# Patient Record
Sex: MALE | Race: WHITE
[De-identification: names, ages, dates, MRNs, and addresses within clinical notes are randomized per-mention and may not be internally consistent; named-entity substitution may affect disease eponyms.]

---

## 2019-11-18 ENCOUNTER — HOSPITAL ENCOUNTER (OUTPATIENT)
Age: 29
End: 2019-11-18
Payer: MEDICARE

## 2019-11-18 DIAGNOSIS — A49.02: ICD-10-CM

## 2019-11-18 DIAGNOSIS — L03.011: Primary | ICD-10-CM

## 2019-11-18 LAB — STAPH AUREUS DNA BY PCR: POSITIVE

## 2019-11-18 PROCEDURE — 87640 STAPH A DNA AMP PROBE: CPT

## 2019-11-18 PROCEDURE — 87077 CULTURE AEROBIC IDENTIFY: CPT

## 2019-11-18 PROCEDURE — 87070 CULTURE OTHR SPECIMN AEROBIC: CPT

## 2019-11-18 PROCEDURE — 87075 CULTR BACTERIA EXCEPT BLOOD: CPT

## 2019-11-18 PROCEDURE — 87186 SC STD MICRODIL/AGAR DIL: CPT

## 2019-11-18 PROCEDURE — 87205 SMEAR GRAM STAIN: CPT

## 2021-11-08 ENCOUNTER — HOSPITAL ENCOUNTER (OUTPATIENT)
Dept: HOSPITAL 100 - EN | Age: 31
Discharge: HOME | End: 2021-11-08
Payer: MEDICARE

## 2021-11-08 VITALS
HEART RATE: 64 BPM | SYSTOLIC BLOOD PRESSURE: 110 MMHG | OXYGEN SATURATION: 100 % | DIASTOLIC BLOOD PRESSURE: 69 MMHG | TEMPERATURE: 96.9 F | RESPIRATION RATE: 16 BRPM

## 2021-11-08 VITALS
OXYGEN SATURATION: 99 % | HEART RATE: 68 BPM | RESPIRATION RATE: 16 BRPM | SYSTOLIC BLOOD PRESSURE: 93 MMHG | DIASTOLIC BLOOD PRESSURE: 67 MMHG

## 2021-11-08 VITALS
RESPIRATION RATE: 16 BRPM | OXYGEN SATURATION: 99 % | DIASTOLIC BLOOD PRESSURE: 69 MMHG | TEMPERATURE: 96.62 F | HEART RATE: 64 BPM | SYSTOLIC BLOOD PRESSURE: 103 MMHG

## 2021-11-08 VITALS
OXYGEN SATURATION: 100 % | DIASTOLIC BLOOD PRESSURE: 69 MMHG | SYSTOLIC BLOOD PRESSURE: 96 MMHG | RESPIRATION RATE: 16 BRPM | HEART RATE: 81 BPM

## 2021-11-08 VITALS
OXYGEN SATURATION: 100 % | SYSTOLIC BLOOD PRESSURE: 99 MMHG | DIASTOLIC BLOOD PRESSURE: 69 MMHG | RESPIRATION RATE: 16 BRPM | HEART RATE: 62 BPM

## 2021-11-08 VITALS
DIASTOLIC BLOOD PRESSURE: 69 MMHG | RESPIRATION RATE: 16 BRPM | HEART RATE: 76 BPM | SYSTOLIC BLOOD PRESSURE: 110 MMHG | OXYGEN SATURATION: 97 % | TEMPERATURE: 97 F

## 2021-11-08 VITALS — DIASTOLIC BLOOD PRESSURE: 69 MMHG | SYSTOLIC BLOOD PRESSURE: 110 MMHG

## 2021-11-08 VITALS — BODY MASS INDEX: 24.9 KG/M2

## 2021-11-08 DIAGNOSIS — K92.1: ICD-10-CM

## 2021-11-08 DIAGNOSIS — R05.3: ICD-10-CM

## 2021-11-08 DIAGNOSIS — K44.9: ICD-10-CM

## 2021-11-08 DIAGNOSIS — K60.2: ICD-10-CM

## 2021-11-08 DIAGNOSIS — K21.00: Primary | ICD-10-CM

## 2021-11-08 DIAGNOSIS — K59.00: ICD-10-CM

## 2021-11-08 PROCEDURE — 0DJD8ZZ INSPECTION OF LOWER INTESTINAL TRACT, VIA NATURAL OR ARTIFICIAL OPENING ENDOSCOPIC: ICD-10-PCS | Performed by: INTERNAL MEDICINE

## 2021-11-08 PROCEDURE — 43239 EGD BIOPSY SINGLE/MULTIPLE: CPT

## 2021-11-08 PROCEDURE — 88313 SPECIAL STAINS GROUP 2: CPT

## 2021-11-08 PROCEDURE — 88305 TISSUE EXAM BY PATHOLOGIST: CPT

## 2021-11-08 PROCEDURE — 45380 COLONOSCOPY AND BIOPSY: CPT

## 2023-04-28 ENCOUNTER — HOSPITAL ENCOUNTER (OUTPATIENT)
Dept: HOSPITAL 100 - MRI | Age: 33
Discharge: HOME | End: 2023-04-28
Payer: MEDICARE

## 2023-04-28 DIAGNOSIS — M54.9: ICD-10-CM

## 2023-04-28 DIAGNOSIS — G95.0: Primary | ICD-10-CM

## 2023-04-28 PROCEDURE — 72146 MRI CHEST SPINE W/O DYE: CPT

## 2023-04-28 PROCEDURE — 72148 MRI LUMBAR SPINE W/O DYE: CPT

## 2023-05-01 ENCOUNTER — HOSPITAL ENCOUNTER (OUTPATIENT)
Age: 33
Discharge: HOME | End: 2023-05-01
Payer: MEDICARE

## 2023-05-01 DIAGNOSIS — F84.9: ICD-10-CM

## 2023-05-01 DIAGNOSIS — G95.0: Primary | ICD-10-CM

## 2023-05-01 DIAGNOSIS — R20.0: ICD-10-CM

## 2023-05-01 DIAGNOSIS — G93.5: ICD-10-CM

## 2023-05-01 DIAGNOSIS — R25.1: ICD-10-CM

## 2023-05-01 DIAGNOSIS — M54.2: ICD-10-CM

## 2023-05-01 PROCEDURE — 72141 MRI NECK SPINE W/O DYE: CPT

## 2023-05-01 PROCEDURE — 70553 MRI BRAIN STEM W/O & W/DYE: CPT

## 2023-05-01 PROCEDURE — A9575 INJ GADOTERATE MEGLUMI 0.1ML: HCPCS

## 2023-08-21 ENCOUNTER — HOSPITAL ENCOUNTER (OUTPATIENT)
Dept: HOSPITAL 100 - MTLAB | Age: 33
Discharge: HOME | End: 2023-08-21
Payer: MEDICARE

## 2023-08-21 DIAGNOSIS — T78.40XA: ICD-10-CM

## 2023-08-21 DIAGNOSIS — R25.1: ICD-10-CM

## 2023-08-21 DIAGNOSIS — F84.9: Primary | ICD-10-CM

## 2023-08-21 DIAGNOSIS — J32.9: ICD-10-CM

## 2023-08-21 DIAGNOSIS — R20.0: ICD-10-CM

## 2023-08-21 DIAGNOSIS — R19.7: ICD-10-CM

## 2023-08-21 LAB
ALANINE AMINOTRANSFER ALT/SGPT: 32 U/L (ref 16–61)
ALBUMIN SERPL-MCNC: 3.8 G/DL (ref 3.2–5)
ALKALINE PHOSPHATASE: 83 U/L (ref 45–117)
AMMONIA: 31 UMOL/L (ref 11–32)
ANION GAP: 6 (ref 5–15)
AST(SGOT): 17 U/L (ref 15–37)
BUN SERPL-MCNC: 22 MG/DL (ref 7–18)
BUN/CREAT RATIO: 35.9 RATIO (ref 10–20)
CALCIUM SERPL-MCNC: 8.9 MG/DL (ref 8.5–10.1)
CARBON DIOXIDE: 27 MMOL/L (ref 21–32)
CHLORIDE: 108 MMOL/L (ref 98–107)
DEPRECATED RDW RBC: 40.1 FL (ref 35.1–43.9)
ERYTHROCYTE [DISTWIDTH] IN BLOOD: 12.2 % (ref 11.6–14.6)
EST GLOM FILT RATE - AFR AMER: 198 ML/MIN (ref 60–?)
FOLATES, (FOLIC ACID): 15.9 NG/ML (ref 3.1–55.4)
GLOBULIN: 3 G/DL (ref 2.2–4.2)
GLUCOSE: 82 MG/DL (ref 74–106)
HCT VFR BLD AUTO: 41.2 % (ref 40–54)
HEMOGLOBIN: 14.7 G/DL (ref 13–16.5)
HGB BLD-MCNC: 14.7 G/DL (ref 13–16.5)
MAGNESIUM: 2.2 MG/DL (ref 1.6–2.6)
MCV RBC: 91.4 FL (ref 80–94)
MEAN CORP HGB CONC: 35.7 G/DL (ref 32–36)
MEAN PLATELET VOL.: 10.3 FL (ref 6.2–12)
PLATELET # BLD: 286 K/MM3 (ref 150–450)
PLATELET COUNT: 286 K/MM3 (ref 150–450)
POTASSIUM: 3.9 MMOL/L (ref 3.5–5.1)
RBC # BLD AUTO: 4.51 M/MM3 (ref 4.6–6.2)
RBC DISTRIBUTION WIDTH CV: 12.2 % (ref 11.6–14.6)
RBC DISTRIBUTION WIDTH SD: 40.1 FL (ref 35.1–43.9)
VITAMIN B12: 458 PG/ML (ref 211–911)
WBC # BLD AUTO: 7.4 K/MM3 (ref 4.4–11)
WHITE BLOOD COUNT: 7.4 K/MM3 (ref 4.4–11)

## 2023-08-21 PROCEDURE — 85027 COMPLETE CBC AUTOMATED: CPT

## 2023-08-21 PROCEDURE — 84443 ASSAY THYROID STIM HORMONE: CPT

## 2023-08-21 PROCEDURE — 36415 COLL VENOUS BLD VENIPUNCTURE: CPT

## 2023-08-21 PROCEDURE — 82607 VITAMIN B-12: CPT

## 2023-08-21 PROCEDURE — 86005 ALLG SPEC IGE MULTIALLG SCR: CPT

## 2023-08-21 PROCEDURE — 84425 ASSAY OF VITAMIN B-1: CPT

## 2023-08-21 PROCEDURE — 82746 ASSAY OF FOLIC ACID SERUM: CPT

## 2023-08-21 PROCEDURE — 86003 ALLG SPEC IGE CRUDE XTRC EA: CPT

## 2023-08-21 PROCEDURE — 83735 ASSAY OF MAGNESIUM: CPT

## 2023-08-21 PROCEDURE — 80053 COMPREHEN METABOLIC PANEL: CPT

## 2023-08-21 PROCEDURE — 82140 ASSAY OF AMMONIA: CPT

## 2023-08-25 LAB
AMER ROACH IGE QN: <0.1 KU/L
BLACK WALNUT IGE QN: <0.1 KU/L
CAT DANDER IGE QN: <0.1 KU/L
COMMON RAGWEED IGE QN: <0.1 KU/L
DEPRECATED IGE QN: <0.1 KU/L
DOG EPITH IGE QN: <0.1 KU/L
EGG WHITE IGE QN: <0.1 KU/L
HAZELNUT POLN IGE QN: <0.1 KU/L
KENT BLUE GRASS IGE QN: <0.1 KU/L
MT JUNIPER IGE QN: <0.1 KU/L
PLANTAIN, ENGLISH: <0.1 KU/L
SCALLOP: <0.1 KU/L
SESAME SEED: <0.1 KU/L
SHEEP SORREL IGE QN: <0.1 KU/L
SYCAMORE, AMERICAN: <0.1 KU/L
VITAMIN B1, THIAMINE: 135.8 NMOL/L (ref 66.5–200)
WHITE ELM IGE QN: <0.1 KU/L
WHITE HICKORY IGE QN: <0.1 KU/L
WHITE MULBERRY IGE QN: <0.1 KU/L
WHITE OAK IGE QN: <0.1 KU/L
WHOLE EGG IGE QN: <0.1 KU/L

## 2023-10-30 ENCOUNTER — HOSPITAL ENCOUNTER (OUTPATIENT)
Dept: HOSPITAL 100 - CT | Age: 33
Discharge: HOME | End: 2023-10-30
Payer: MEDICARE

## 2023-10-30 DIAGNOSIS — J32.8: Primary | ICD-10-CM

## 2023-10-30 PROCEDURE — 70486 CT MAXILLOFACIAL W/O DYE: CPT

## 2023-11-01 ENCOUNTER — HOSPITAL ENCOUNTER (OUTPATIENT)
Dept: HOSPITAL 100 - LABSPEC | Age: 33
Discharge: HOME | End: 2023-11-01
Payer: MEDICARE

## 2023-11-01 DIAGNOSIS — J32.8: Primary | ICD-10-CM

## 2023-11-01 PROCEDURE — 87077 CULTURE AEROBIC IDENTIFY: CPT

## 2023-11-01 PROCEDURE — 87205 SMEAR GRAM STAIN: CPT

## 2023-11-01 PROCEDURE — 87186 SC STD MICRODIL/AGAR DIL: CPT

## 2023-11-01 PROCEDURE — 87070 CULTURE OTHR SPECIMN AEROBIC: CPT

## 2024-03-06 ENCOUNTER — HOSPITAL ENCOUNTER (OUTPATIENT)
Age: 34
Discharge: HOME | End: 2024-03-06
Payer: MEDICARE

## 2024-03-06 DIAGNOSIS — J01.90: Primary | ICD-10-CM

## 2024-03-06 PROCEDURE — 87077 CULTURE AEROBIC IDENTIFY: CPT

## 2024-03-06 PROCEDURE — 87205 SMEAR GRAM STAIN: CPT

## 2024-03-06 PROCEDURE — 87070 CULTURE OTHR SPECIMN AEROBIC: CPT

## 2024-03-06 NOTE — XMS RPT_ITS
Comprehensive CCD (C-CDA v2.1)  
  
                            Created on: 2024  
  
  
TELMA HENRY  
External Reference #: CDR,PersonID:[id number]  
: 1990  
Sex: Male  
  
Demographics  
  
  
                                        Address             [address] [place] [address] [place]  [address]  
   
                                        Home Phone          [phone]  
   
                                        Home Phone          [phone]  
   
                                        Work Phone          [phone]  
   
                                        Work Phone          [phone]  
   
                                        Preferred Language  en  
   
                                        Marital Status      Single  
   
                                        Christianity Affiliation Unknown  
   
                                        Race                White  
   
                                        Ethnic Group        Not  or Lati  
no  
  
  
Author  
  
  
                                        Name                Unknown  
   
                                        Address             [address]  
[address]  
[place]  [address]  
   
                                        Phone               [phone]  
   
                                        Organization        [facility]  
  
  
Care Team Providers  
  
  
                                Care Team Member Name Role            Phone  
   
                                Nagi Hicks Unavailable     Unavailable  
   
                                Nagi Hicks Unavailable     Unavailable  
   
                                Alexa Barrera Unavailable     Unavailable  
   
                                Alexa Barrera Primary Care Provider [phone]  
   
                                Alexa Barrera Primary Care Provider [phone]  
   
                                TINO BAUGH, DR. ALEXA IGLESIAS Primary Care    Unavailjaky GOLDSTEIN MD, SALLY IGLESIAS Admitting       Unavailable  
   
                                SALLY GOLDSTEIN MD Referring       Unavailable  
   
                                MAURI MCCOY DO Attending       Unavailable  
   
                                MITESH DIALLO Referring       Unavailable  
   
                                ALEXA BARRERA Primary Care    Unavailable  
   
                                RUTH ALEXANDER Attending       Unavailable  
   
                                Alexa Barrera MD Primary Care Provider [phone]  
  
  
  
Allergies  
  
  
                                                    Allergy   
Classification                          Reported   
Allergen(s)               Allergy Type              Date of   
Onset                     Reaction(s)               Facility  
   
                                                      
(3 sources)                             Amoxicillin;   
Translations:   
[AMOXICILLIN]   Drug Allergy    2010      Rash            [facility]  
  
  
  
Medications  
Current Medications  
  
  
  
                      Medication Drug Class(es) Dates      Sig (Normalized) Sig   
(Original)  
   
                                                    clindamycin 300 mg   
oral capsule  
(1 source)                              Lincosamide   
Antibacterial                           Start:   
2023  
End:   
2024                              take 1 capsule by   
mouth three times   
daily                                   clindamycin   
(CLEOCIN) 300 mg   
capsule   
Indications:   
Paronychia of   
finger,   
unspecified   
laterality Take 1   
capsule by mouth   
three times a day   
for 10 days. 30   
capsule 0   
2023 Active  
  
  
  
                                          
  
  
  
Completed/Discontinued Medications  
  
  
  
                      Medication Drug Class(es) Dates      Sig (Normalized) Sig   
(Original)  
   
                                                    albuterol 0.83   
mg/ml inhalation   
solution  
(4 sources)                             beta2-Adrenergic   
Agonist                                 Start:   
10-                                          albuterol   
(PROVENTIL) 2.5 mg   
/3 mL (0.083 %)   
nebulizer solution   
3 mL by nebulizer   
q4h prn breathing   
for 30 days 0   
10/24/2018 Active  
  
  
  
                                          
   
                                          
   
                                          
   
                                          
   
                                          
   
                                          
   
                                          
   
                                          
   
                                          
   
                                          
   
                                          
   
                                          
   
                                          
   
                                          
   
                                          
   
                                          
   
                                          
   
                                          
   
                                          
   
                                          
   
                                          
   
                                          
   
                                          
   
                                          
   
                                          
   
                                          
  
  
  
Problems  
Active Problems  
  
  
                                                    Problem   
Classification  Problem         Date            Documented Date Episodic/Chronic  
   
                                                    Other nervous system   
disorders  
(3 sources)                             Syringomyelia and   
syringobulbia;   
Translations:   
[Syringomyelia and   
syringobulbia]                          Onset:   
2022                                          Chronic  
   
                                                    Other nervous system   
disorders  
(1 source)                              Syringomyelia and   
syringobulbia;   
Translations:   
[Syringomyelia and   
syringobulbia (HCC)]                    Onset:   
2022                                          Chronic  
   
                                                    Skin and subcutaneous   
tissue infections  
(2 sources)                             Cellulitis of   
unspecified finger;   
Translations:   
[Paronychia of   
finger]                                 Onset:   
2023                Episodic  
  
  
Past or Other Problems  
  
  
                      Problem Classification Problem    Date       Documented Da  
te Episodic/Chronic  
   
                                                    Other bone disease and   
musculoskeletal   
deformities  
(2 sources)                             Disorder of   
skeletal system;   
Translations:   
[Disorder of   
bone,   
unspecified]                            Onset:   
2010                Episodic  
  
  
  
Results  
  
  
                      Test Name  Value      Interpretation Reference Range Facil  
ity  
  
  
  
                                          
   
                                          
   
                                          
   
                                          
   
                                          
   
                                          
   
                                          
   
                                          
   
                                          
   
                                          
   
                                          
   
                                          
   
                                          
   
                                          
   
                                          
   
                                          
   
                                          
   
                                          
   
                                          
   
                                          
   
                                          
   
                                          
   
                                          
   
                                          
   
                                          
   
                                          
   
                                          
   
                                          
   
                                          
   
                                          
   
                                          
   
                                          
   
                                          
   
                                          
   
                                          
   
                                          
   
                                          
   
                                          
   
                                          
   
                                          
   
                                          
   
                                          
   
                                          
   
                                          
   
                                          
   
                                          
   
                                          
   
                                          
   
                                          
   
                                          
   
                                          
   
                                          
   
                                          
   
                                          
   
                                          
   
                                          
   
                                          
   
                                          
   
                                          
   
                                          
   
                                          
   
                                          
   
                                          
   
                                          
   
                                          
   
                                          
   
                                          
   
                                          
   
                                          
   
                                          
   
                                          
   
                                          
   
                                          
   
                                          
   
                                          
   
                                          
   
                                          
   
                                          
   
                                          
   
                                          
   
                                          
   
                                          
   
                                          
   
                                          
   
                                          
   
                                          
   
                                          
   
                                          
   
                                          
   
                                          
   
                                          
   
                                          
   
                                          
   
                                          
   
                                          
   
                                          
   
                                          
   
                                          
   
                                          
   
                                          
   
                                          
   
                                          
   
                                          
   
                                          
   
                                          
   
                                          
   
                                          
   
                                          
   
                                          
   
                                          
   
                                          
   
                                          
   
                                          
   
                                          
   
                                          
   
                                          
   
                                          
   
                                          
   
                                          
   
                                          
   
                                          
   
                                          
   
                                          
   
                                          
   
                                          
   
                                          
   
                                          
   
                                          
   
                                          
   
                                          
   
                                          
   
                                          
   
                                          
   
                                          
   
                                          
   
                                          
   
                                          
   
                                          
   
                                          
   
                                          
   
                                          
   
                                          
   
                                          
   
                                          
   
                                          
   
                                          
   
                                          
   
                                          
   
                                          
   
                                          
   
                                          
   
                                          
   
                                          
   
                                          
   
                                          
   
                                          
   
                                          
   
                                          
   
                                          
   
                                          
   
                                          
   
                                          
   
                                          
   
                                          
   
                                          
   
                                          
   
                                          
   
                                          
   
                                          
   
                                          
   
                                          
   
                                          
   
                                          
   
                                          
   
                                          
   
                                          
   
                                          
   
                                          
   
                                          
   
                                          
   
                                          
   
                                          
   
                                          
   
                                          
   
                                          
   
                                          
   
                                          
   
                                          
   
                                          
   
                                          
   
                                          
   
                                          
   
                                          
   
                                          
   
                                          
   
                                          
   
                                          
   
                                          
   
                                          
   
                                          
   
                                          
   
                                          
   
                                          
   
                                          
   
                                          
   
                                          
   
                                          
   
                                          
   
                                          
   
                                          
   
                                          
   
                                          
   
                                          
   
                                          
   
                                          
   
                                          
   
                                          
   
                                          
   
                                          
   
                                          
   
                                          
   
                                          
   
                                          
   
                                          
   
                                          
   
                                          
   
                                          
   
                                          
   
                                          
   
                                          
   
                                          
   
                                          
   
                                          
   
                                          
   
                                          
   
                                          
   
                                          
   
                                          
   
                                          
   
                                          
   
                                          
   
                                          
   
                                          
   
                                          
   
                                          
   
                                          
   
                                          
   
                                          
   
                                          
   
                                          
   
                                          
   
                                          
   
                                          
   
                                          
   
                                          
   
                                          
   
                                          
   
                                          
   
                                          
   
                                          
   
                                          
   
                                          
   
                                          
   
                                          
   
                                          
   
                                          
   
                                          
   
                                          
   
                                          
   
                                          
   
                                          
   
                                          
   
                                          
   
                                          
   
                                          
   
                                          
   
                                          
   
                                          
   
                                          
   
                                          
   
                                          
   
                                          
   
                                          
   
                                          
   
                                          
   
                                          
   
                                          
   
                                          
   
                                          
   
                                          
   
                                          
   
                                          
   
                                          
   
                                          
   
                                          
   
                                          
   
                                          
   
                                          
   
                                          
   
                                          
   
                                          
   
                                          
   
                                          
   
                                          
   
                                          
   
                                          
   
                                          
   
                                          
   
                                          
   
                                          
   
                                          
   
                                          
   
                                          
   
                                          
   
                                          
   
                                          
   
                                          
   
                                          
   
                                          
   
                                          
   
                                          
   
                                          
   
                                          
   
                                          
   
                                          
   
                                          
   
                                          
   
                                          
   
                                          
   
                                          
   
                                          
   
                                          
   
                                          
   
                                          
   
                                          
   
                                          
   
                                          
   
                                          
   
                                          
   
                                          
   
                                          
   
                                          
   
                                          
  
  
  
Vital Signs  
  
  
                      Date Time  Vital Sign Value      [facility]  
[facility]ity  
   
                                                    2023   
13:          Body temperature    98.1 [degF]         Ruth Santanan DO  
Work Phone:   
[phone]                          [facility]  
   
                                                    2023   
13:          Body weight         54.43 kg            Ruth Beanughn DO  
Work Phone:   
[phone]                          [facility]  
   
                                                    2023   
13:                              Diastolic blood   
pressure                  71 mm[Hg]                 Ruth Beanughn DO  
Work Phone:   
[phone]                          [facility]  
   
                                                    2023   
13:          Heart rate          81 /min             Ruth Beanughn DO  
Work Phone:   
[phone]                          [facility]  
   
                                                    2023   
13:          Respiratory rate    20 /min             Ruth Beanughn DO  
Work Phone:   
[phone]                          [facility]  
   
                                                    2023   
13:                              SaO2% (BldA) [Mass   
fraction]                 95 %                      Ruth Beanughn DO  
Work Phone:   
[phone]                          [facility]  
   
                                                    2023   
13:                              Systolic blood   
pressure                  111 mm[Hg]                Ruth Alexander DO  
Work Phone:   
[phone]                          [facility]  
  
  
  
Encounters  
  
  
                          Encounter Date Encounter Type Care Provider Facility  
   
                                                    Start: 2023  
End: 2023     ambulatory          ALEXA BARRERA    Facility:[id number]  
   
                                                    Start: 2023  
End: 2023                         Patient encounter   
procedure                               Ruth Alexander DO  
Work Phone:   
[phone]                          [facility]   
[facility]  
  
  
  
                                          
   
                                          
   
                                          
   
                                          
   
                                          
   
                                          
   
                                          
   
                                          
   
                                          
  
  
  
Procedures  
  
  
                          Date         Procedure    Procedure Detail Performing   
Clinician  
   
                                        Start: 2022   COMPREHENSIVE ORAL   
EVALUATION - NEW OR   
ESTABLISHED PATIENT                                 Luis Austinno DDS  
Work Phone:   
[phone]  
   
                          Start: 2022 PERIODONTAL PROBING              Jus  
martha Yepez DDS  
Work Phone:   
[phone]  
   
                                        Start: 2021   Adult depression scr  
eening   
assessment                                          Luis Yepez DDS  
Work Phone:   
[phone]  
   
                                        Start: 2020   Mri spinal canal tho  
racic   
w/o contrast matrl                                  Valeria Montana  
Work Phone:   
[phone]  
   
                                        Start: 2020   Mri spinal canal cer  
vical   
w/o contrast matrl                                  Mitesh Diallo  
Work Phone:   
[phone]  
  
  
  
Plan of Treatment  
  
  
                          Date         Care Activity Detail       Author  
   
                                                    Start:   
2027                              Urine microalbumin   
profile                                 DTaP,Tdap,Td Vaccine (2   
- Td or Tdap)                           [facility]  
   
                                                    Start:   
2023                              Covid-19 Vaccine ( season)                         Covid-19 Vaccine ( season)                         [facility]  
   
                                                    Start:   
2023          Depression Assessment Depression Assessment [facility]  
   
                                                    Start:   
2022          Influenza vaccination INFLUENZA (#1)      [facility]  
   
                                                    Start:   
2022                              Adult depression   
screening assessment      DEPRESSION SCREENING      [facility]  
   
                                                    Start:   
2020          Influenza vaccination Flu vaccine (#1)    [place]  
   
                                                    Start:   
2011                              DTaP/Tdap/Td vaccine (1   
- Tdap)                                 DTaP/Tdap/Td vaccine (1   
- Tdap)                                 [place]  
   
                                                    Start:   
2011                              Urine microalbumin   
profile                   DTAP,TDAP,TD (1 - Tdap)   [facility]  
   
                                                    Start:   
2010          HEPATITIS C SCREENING HEPATITIS C SCREENING [facility]  
   
                                                    Start:   
2010          Hepatitis C screening Hepatitis C Screening [facility]  
   
                                                    Start:   
2010          HIV SCREENING       HIV SCREENING       [facility]  
   
                                                    Start:   
2010          HIV screening       HIV Screening       [facility]  
   
                                                    Start:   
2007          HIV screening       HIV screen          [place]  
   
                                                    Start:   
1993                              Varicella vaccine (1 of   
2 - 2-dose childhood   
series)                                 Varicella vaccine (1 of   
2 - 2-dose childhood   
series)                                 [place]  
   
                                                    Start:   
1992                              HEPATITIS B (1 of 3 -   
3-dose series)                          HEPATITIS B (1 of 3 -   
3-dose series)                          [facility]  
   
                                                    Start:   
1992                              Hepatitis B Vaccine (1   
of 3 - 3-dose series)                   Hepatitis B Vaccine (1   
of 3 - 3-dose series)                   [facility]  
   
                                                PERIODONTAL MAINTENANCE PERIODON  
ERIK MAINTENANCE   
Dental Routine 1   
Occurrences starting   
2022                              [facility]  
Work Phone:   
[phone]  
  
  
  
                                          
   
                                          
   
                                          
   
                                          
  
  
  
Payers  
  
  
                          Date         Payer Category Payer        Policy ID  
   
                          2022   Unknown                   [id number]  
[id number]  
[id number]  
   
                                2021      Medicare        [facility] MEDICARE [facility]  
 DUAL   
COMPLETE HMO SNP   
[id number]   
2021-Present   
[phone] [address]   
[address] [place]   
[address] Medicare                     [id number]  
[id number]  
   
                          2021   Private Health Insurance              [id number]  
[id number]  
   
                          2015   Medicaid                  [id number]  
   
                                2015      Medicaid        MEDICAID [facility]  
   
MEDICAID [id number]   
2015-Present   
[phone] [address]   
[address] [place] [address]   
Medicaid                                [id number]  
[id number]  
   
                          1992   Unknown                   [id number]   
[id number]  
[id number]  
  
  
  
Social History  
  
  
                          Date         Type         Detail       Facility  
   
                                                            [facility]  
[facility]                      Unknown if ever smoked    [employer]  
   
                                       Sex Assigned At Birth Not on file  [email or web address] [place], [place]  
   
                                        Start: 2022   Tobacco smoking stat  
[facility]                      Never smoked tobacco      [facility]  
   
                                        Start: 2022   Tobacco use and   
exposure                                Smokeless tobacco   
non-user                                [facility]  
   
                                                    Start: 2022  
End: 2023           Alcohol intake            Current drinker of   
alcohol (finding)                       [facility]  
   
                                        Start: 2021   History SDOH Alcohol  
   
Comment                   occasionally              [facility]  
   
                          Start: 1990 Sex Assigned At Birth Male         C  
[facility]  
   
                                                    Start: 09-  
End: 2022                         Exposure to SARS-CoV-2   
(event)                   Not sure                  [facility]  
   
                                                    Start: 2023  
End: 2023                         History of Social   
function                                            [facility]  
   
                                                    Start: 2023  
End: 2023     Tobacco use panel                       [facility]  
   
                                                            Adult Depression   
Screening Assessment      0                         [facility]  
   
                                Start: 10- Gender identity Identifies as   
male   
gender (finding)                        [facility]  
   
                          Start: 10- Sexual orientation Heterosexual (fin  
ding) [facility]  
  
  
  
Progress note 2023  
  
  
                                Note Date & Type Note            Facility  
   
                                        2023 Note     [facility] ID: [id number]  
Author: Ruth Alexander, DO  
Service: ?  
Author Type: Physician  
Type: Progress Notes  
Filed: 2023 2:15 PM  
Note Text:  
Telma Henry is a 33 year old MALE who   
presents with Right middle  
finger swollen, red, puss ([3 days] Right hand   
middle finger festering.  
Left hand index and little finger.)  
HPI  
PAST MEDICAL HISTORY  
Diagnosis Date  
Arnold-Chiari malformation, type I (HCC)  
Arthritis  
Asthma  
GERD (gastroesophageal reflux disease)  
Scoliosis  
ACTIVE PROBLEM LIST  
Disorder of Bone and Cartilage, Unspecified  
Current Outpatient Medications  
Medication Sig Dispense Refill  
albuterol (PROVENTIL) 2.5 mg /3 mL (0.083 %)   
nebulizer solution 3 mL by  
nebulizer q4h prn breathing for 30 days  
albuterol HFA (PROVENTIL HFA, VENTOLIN HFA) 90   
mcg/actuation inhaler  
Inhale as instructed.  
baclofen (GABLOFEN) 50 mcg/mL (1 mL) injection   
0.05 ml Orally Twice a day  
diclofenac (VOLTAREN) 1 % topical gel  
doxycycline (VIBRA-TABS) 100 mg tablet  
fluticasone (FLONASE ALLERGY RELIEF) 50   
mcg/actuation nasal spray 1  
spray(s) intranasally once a day for 30 day(s)  
meloxicam (MOBIC) 15 mg tablet  
pantoprazole DR (PROTONIX) 40 mg tablet  
Chlorhexidine Gluconate (PERIDEX) 0.12 %   
solution Rinse around the mouth  
with 1 to 2 teaspoons for 30 seconds then   
expectorate. 473 mL 1  
gabapentin (NEURONTIN) 400 mg capsule  
fexofenadine HCl (ALLEGRA ORAL) Take by mouth.   
Take one tablet in the  
morning daily  
MULTI-VITAMIN ORAL Take by mouth.  
CLONIDINE 0.1 MG TAB Take 1 tablet in the AM   
and take 2 tablets at  
bedtime 0  
ALBUTEROL 90 MCG/ACTUATION AEROSOL INHALER   
Inhale one(1) - two(2) puffs  
four(4) times a day as needed for wheezing and   
shortness of breath. 0  
clindamycin (CLEOCIN) 300 mg capsule Take 1   
capsule by mouth three times  
a day for 10 days. 30 capsule 0  
No current facility-administered medications   
for this visit.  
Social History  
Tobacco Use  
Smoking status: Never  
Smokeless tobacco: Never  
Vaping Use  
Vaping Use: Never used  
Substance Use Topics  
Alcohol use: Yes  
Comment: occasionally  
Drug use: Never  
Alcohol Use: Yes (occasionally)  
Tobacco Use: Never  
FAMILY HISTORY  
Problem Relation Age of Onset  
Diabetes Maternal Aunt  
other (macular degeneration [Other]) Father  
Review of Systems  
Skin:  
Patient has infected fingers on both hands its   
his index finger on  
both hands infected paronychia  
All other systems reviewed and are negative.  
/71   Pulse 81   Temp 98.1   Resp 20   Wt   
120 lb (54.4kg)   SpO2 95%  
Physical Exam  
Vitals and nursing note reviewed.  
Constitutional:  
Appearance: Normal appearance.  
Skin:  
General: Skin is warm.  
Findings: Erythema present.  
Comments: Both hands have an infected finger   
bilaterally and it looks  
like the one on the right is much worse and he   
has been soaking it in  
peroxide which is not the best choice. But he   
bites his nails and trims  
his nails and he got them too close and then he   
washed dishes at [employer]  
and that had his hand down and that dirty water   
and did not have any  
gloves on and because of the paronychia to form  
Neurological:  
Mental Status: He is alert.  
ASSESSMENT/PLAN:  
1. Paronychia of finger, unspecified laterality   
- ICD9: 681.02, ICD10:  
L03.019  
- CLINDAMYCIN  MG CAPSULE  
Ruth DONNY Alexander                           [facility]  
  
  
  
History of Present illness Narrative 2023  
                 BenjaminRuthn, DO - 2023 2:12 PM EST  
  
                                Note Date & Type Note            Facility  
   
                                                    2023 History of Presen  
t   
illness Narrative                       Formatting of this note is   
different from the original.  
Telma Henry is a 33 year old   
MALE who presents with Right middle   
finger swollen, red, puss ([3 days]   
Right hand middle finger festering.   
Left hand index and little finger.)  
  
HPI  
  
PAST MEDICAL HISTORY  
Diagnosis Date  
Arnold-Chiari malformation, type I   
(HCC)  
Arthritis  
Asthma  
GERD (gastroesophageal reflux   
disease)  
Scoliosis  
  
ACTIVE PROBLEM LIST  
Disorder of Bone and Cartilage,   
Unspecified  
  
  
Current Outpatient Medications  
Medication Sig Dispense Refill  
albuterol (PROVENTIL) 2.5 mg /3 mL   
(0.083 %) nebulizer solution 3 mL   
by nebulizer q4h prn breathing for   
30 days  
albuterol HFA (PROVENTIL HFA,   
VENTOLIN HFA) 90 mcg/actuation   
inhaler Inhale as instructed.  
baclofen (GABLOFEN) 50 mcg/mL (1   
mL) injection 0.05 ml Orally Twice   
a day  
diclofenac (VOLTAREN) 1 % topical   
gel  
doxycycline (VIBRA-TABS) 100 mg   
tablet  
fluticasone (FLONASE ALLERGY   
RELIEF) 50 mcg/actuation nasal   
spray 1 spray(s) intranasally once   
a day for 30 day(s)  
meloxicam (MOBIC) 15 mg tablet  
pantoprazole DR (PROTONIX) 40 mg   
tablet  
Chlorhexidine Gluconate (PERIDEX)   
0.12 % solution Rinse around the   
mouth with 1 to 2 teaspoons for 30   
seconds then expectorate. 473 mL 1  
gabapentin (NEURONTIN) 400 mg   
capsule  
fexofenadine HCl (ALLEGRA ORAL)   
Take by mouth. Take one tablet in   
the morning daily  
MULTI-VITAMIN ORAL Take by mouth.  
CLONIDINE 0.1 MG TAB Take 1 tablet   
in the AM and take 2 tablets at   
bedtime 0  
ALBUTEROL 90 MCG/ACTUATION AEROSOL   
INHALER Inhale one(1) - two(2)   
puffs four(4) times a day as needed   
for wheezing and shortness of   
breath. 0  
clindamycin (CLEOCIN) 300 mg   
capsule Take 1 capsule by mouth   
three times a day for 10 days. 30   
capsule 0  
  
No current facility-administered   
medications for this visit.  
  
Social History  
  
Tobacco Use  
Smoking status: Never  
Smokeless tobacco: Never  
Vaping Use  
Vaping Use: Never used  
Substance Use Topics  
Alcohol use: Yes  
Comment: occasionally  
Drug use: Never  
  
  
Alcohol Use: Yes (occasionally)  
  
Tobacco Use: Never  
  
FAMILY HISTORY  
Problem Relation Age of Onset  
Diabetes Maternal Aunt  
other (macular degeneration   
[Other]) Father  
  
Review of Systems  
Skin:  
Patient has infected fingers on   
both hands its his index finger on   
both hands infected paronychia  
All other systems reviewed and are   
negative.  
  
  
/71   Pulse 81   Temp 98.1     
Resp 20   Wt 120 lb (54.4kg)   SpO2   
95%  
  
  
  
Physical Exam  
Vitals and nursing note reviewed.  
Constitutional:  
Appearance: Normal appearance.  
Skin:  
General: Skin is warm.  
Findings: Erythema present.  
Comments: Both hands have an   
infected finger bilaterally and it   
looks like the one on the right is   
much worse and he has been soaking   
it in peroxide which is not the   
best choice. But he bites his nails   
and trims his nails and he got them   
too close and then he washed dishes   
at [employer] and that had his hand   
down and that dirty water and did   
not have any gloves on and because   
of the paronychia to form  
Neurological:  
Mental Status: He is alert.  
  
  
  
ASSESSMENT/PLAN:  
1. Paronychia of finger,   
unspecified laterality - ICD9:   
681.02, ICD10: L03.019  
  
- CLINDAMYCIN  MG CAPSULE  
  
Ruth Alexander  
Electronically signed by Ruth Alexander DO at 2023 2:15 PM   
EST  
documented in this encounter            [facility]  
  
  
  
History of Present illness Narrative 2022  
                  Luis Yepez DDS - 2022 11:50 AM EDT  
  
                                Note Date & Type Note            Facility  
   
                                                    2022 History of Presen  
t   
illness Narrative                       Formatting of this note might be   
different from the original.  
COMPREHENSIVE ORAL EXAM  
S: Patient presents for myah  
A: Decay/Restorations: charted  
Periodontal Dx: generalized mild   
chronic periodontitis with heavy   
plaque buildup  
Oral Cancer Screening: wnl  
P: Reviewed/Updated Medical history;   
comprehensive oral exam completed   
with periodontal evaluation, dental   
charting, and selective radiographs   
taken. Exam findings were discussed.  
Pt is special needs. Previously saw   
Dr. Carl Molina, who was able to   
complete prophy's while patient was   
awake. Pt has no hx of caries.   
Existing #16 is partially erupted   
with carious decay. #19 O decay.   
Recommended #19 O restoration.   
Referral to [facility] for ext of #16   
under sedation. Pt presented with   
highly inflammed gingiva,   
subgingival calculus, heavy plaque,   
and minor bone loss resulting in dx   
of periodontitis. Recommended 4   
quads SRP.  
  
Discussed with pt's parent to   
attempt treatment of #19 O and SRP   
awake at [facility]. Cautioned if pt   
is unable to cooperate to perform   
the treatment safely then pt will   
need to be referred to an OR for   
sedation dentistry. Pt's parent   
understood.  
  
Gave referral to [facility] for ext of   
#16.  
  
Rx: peridex  
  
The following treatment plan was   
made:  
1. #19 O composite or amalgam  
2. SRP 4 quads  
  
Patient discharged in good   
condition.  
NV: #19  
Electronically signed by Luis Yepez DDS at 2022 11:55 AM   
EDT  
documented in this encounter            [facility]  
  
  
  
Progress note 2021  
  
  
                                Note Date & Type Note            Facility  
   
                                        2021 Note     [facility] ID: [id number]  
Author: Mike Vargas MD  
Service: ?  
Author Type: Physician  
Type: Progress Notes  
Filed: 2021 2:36 PM  
Note Text:  
This note was created using NoteWriter.  
Subjective  
Telma Henry is a 30 year old male.   
He has ah/o developmental  
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   
active chicken pox. ?Patient had a  
chiari decompression in 2008 due   
to increased difficulty walking.  
?Surgery was done by Dr. Castro.   
?After surgery he started having pain  
in the neck and right shoulder area. ?He   
has been trying to have this  
treated, he has been through numerous   
pain regimes and trying to cure this  
pain himself.  
?  
He continues to have more pain in the   
neck into the shoulder, down the R  
arm and into the middle finger and ring   
finger. He has no feeling in the  
arm for about 4 years. This started in   
the two middle fingers. Mother was  
told that this was caused by the syrinx.   
He can use the arm. He has  
started trembling and numbing on the   
forearm and upper arm over the past 6  
mos. The tremors going on for about 2   
years. He is now developing L wrist  
pain and tingling and wearing a wrist   
brace. Mother took him to the  
neurologist. He was concerned that his   
spine may have moved a bit. They  
have MRIs every year through their   
neurologist. No change in the walking.  
He has abnormal gait to begin with but no   
changes. He has lost some  
weight. No change in bowel or bladder   
function. He does have an on-going  
fissure.  
?  
Review of Systems  
Objective  
/67   Pulse 84   Resp 16   Ht 150.5   
cm (4' 11.25 )   Wt 55.1 kg  
(121 lb 8 oz)   SpO2 98%   BMI 24.33   
kg/m?  
Physical Exam  
Assessment and Plan  
S/p Chiari decompression   
Persistent cervicothoracic syrinx  
Ongoing pain in R neck, shoulder, arm  
New tremors in R hand  
Numbing in forearm and upper arm on R  
Pain in 4th and 5th digits  
Newer onset of L wrist pain  
?  
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   
with active chicken pox.  
?  
Reviewed image:  
MRI reviewed from 2020  
Cervical and thoracic imaging  
Long thin cervical thoracic syrinx 1mm   
diameter, pre-surgery in  9mm  
diameter  
Large craniotomy defect  
Well reconstucted cisterna magna  
Plan:  
Explained findings and pointed out on MRI  
Continue with Neurontin 1200 mg / day  
Maintain activity- no heavy lifting over   
30lbs  
Mother id grateful  
Pt seen and examined by Dr. Vargas. All   
recommendations initiated by Dr. Vargas.  
Mike Vargas MD                     [facility]  
  
  
  
Progress note 2021  
  
  
                                Note Date & Type Note            Facility  
   
                                        2021 Note     [facility] ID: [id number]  
Author: Juan Jade MD  
Service: ?  
Author Type: Physician  
Type: Progress Notes  
Filed: 5/3/2021 11:52 AM  
Note Text:  
Virtual visit completed. Telma is a very   
pleasant 29 y/o M with history  
of Chiari (s/p decompression) and   
syringomyelia. He complains of chronic  
neck pain (mostly at the site of his   
posterior scar) and pain in his right  
shoulder blade. MRI cervical spine shows   
no sig central or foraminal  
stenosis. X-rays reveal a well balanced   
28 deg right thoracic AIS curve.  
Discussed the importance of a regular   
PT/home exercise program. No  
indication for surgery for his AIS curve.   
Recommend new scoliosis x-rays  
in 2 years.  
Juan Jade MD                      [facility]  
  
  
  
Progress note 2021  
  
  
                                Note Date & Type Note            Facility  
   
                                        2021 Note     [facility] ID: [id number]  
Author: Ned Estrella (Tech)  
Service: Radiology  
Author Type: Technician  
Type: Progress Notes  
Filed: 2021 1:17 PM  
Note Text:  
Radiology Service Progress Note  
PATIENT NAME: Telma Henry  
MRN: [id number]  
DATE OF SERVICE: 2021  
TIME: 1:17 PM  
PATIENT IDENTITY VERIFICATION COMPLETED   
USING TWO (2) IDENTIFIERS: Name  
and Date of Birth confirmed by patient   
verbally.  
FALL SCREENING: Has the patient had 2   
falls in the last year or 1 fall  
with injury or currently using an   
Ambulatory Assistive Device (Walker,  
Cane, Wheelchair, Crutches, etc.)? No  
PATIENT GENDER DATA: Male  
PATIENT RELEVANT IMPLANT DATA REVIEWED:   
Not Applicable  
RADIOLOGY DEPARTMENT: General X-ray:   
Exam(s) Completed: Spine X-Ray(s):  
[facility]  
PERIPHERAL IV DATA: Not applicable  
SIGNED BY: Ned Estrella  
2021 1:17 PM                  [facility]  
  
  
  
Progress note 2021  
  
  
                                Note Date & Type Note            Facility  
   
                                        2021 Note     HNO ID: [id number]  
Author: NAKITA Hernandez (Cnp).CNP  
Service: ?  
Author Type: Nurse Practitioner  
Type: Progress Notes  
Filed: 2021 2:52 PM  
Note Text:  
SPINE SURGERY OUTPATIENT CONSULT  
SERVICE DATE: 2021  
PCP: Alexa Barrera MD  
REFERRING PROVIDER:  
Valeria Montana PA-C  
[address] [facility] [address]  
Consult requested for an opinion   
regarding the evaluation and treatment  
of mid back pain. My final impression and   
recommendations will be  
communicated back to the requesting   
physician by way of the shared medical  
record or letter via US mail.  
SUBJECTIVE  
Telma Henry is a 30 year old male   
presenting with mother.  
CHIEF COMPLAINT: Neck/Mid Back Pain  
HISTORY OF PRESENT ILLNESS  
Hx of chiari decompression in  at   
[facility]. Currently seeing  
Dr. Greenwood  
C/o neck (occipital) pain that radiates   
into the mid back x6 months. Pain  
is intermittent at random. Notes numbness   
in right arm into the  
middle/ring finger. Denies any subjective   
weakness in arms.  
Denies any changes in balance instability   
aside from baseline.  
Denies any loss in bowel/bladder control.  
Conservative Management  
-Cortisone injection (scapula bursitis)  
-Physical Therapy- completed in 2021  
-NSAIDS- Aleve (Naproxen)  
-Gabapentin 1200 per day  
-Massage therapy  
-Heat/Ice Therapy  
PRECIPITATING EVENT: None  
DURATION OF SYMPTOMS: Greater Than 6   
Months  
PAIN EVALUATION  
No data found in the last 1 encounters.  
DERMATOMAL DISTRIBUTION:  
Not applicable  
AMBULATORY STATUS: Independent Community   
Distances  
ANTIPLATELET OR ANTICOAGULATION STATUS:   
No  
PREVIOUS SPINAL SURGERY: None  
ACTIVE PROBLEM LIST  
Disorder of Bone and Cartilage,   
Unspecified  
PAST MEDICAL HISTORY  
Diagnosis Date  
- Arnold-Chiari malformation, type I   
(HCC)  
- Scoliosis  
PAST SURGICAL HISTORY  
Procedure Laterality Date  
- PAST SURGICAL HISTORY OF 1993  
lung surgery  
- PAST SURGICAL HISTORY OF  
clavicle surgery  
- REMOVAL OF TONSILS,<15 Y/O  
Tonsillectomy  
FAMILY HISTORY  
Problem Relation Age of Onset  
- Diabetes Maternal Aunt  
- other (macular degeneration [Other])   
Father  
Social History  
Tobacco Use  
- Smoking status: Former Smoker  
Substance Use Topics  
- Alcohol use: Not on file  
- Drug use: Not on file  
ALLERGIES  
Allergen Reactions  
- Amoxil [Amoxicillin]  
MEDICATIONS:  
FLUTICASONE PROPIONATE (FLONASE NASAL)   
Use in the nose as needed.  
fluticasone-salmeterol (ADVAIR DISKUS)   
250-50 mcg/dose dsdv Inhale 1 Puff  
as instructed twice daily.  
NAPROXEN SODIUM (ALEVE ORAL) Take by   
mouth twice daily.  
MULTI-VITAMIN ORAL Take by mouth.  
CLONIDINE 0.1 MG TAB Take 1 tablet in the   
AM and take 2 tablets at bedtime  
ALBUTEROL 90 MCG/ACTUATION AEROSOL   
INHALER Inhale one(1) - two(2) puffs  
four(4) times a day as needed for   
wheezing and shortness of breath.  
REVIEW OF SYSTEMS:  
PAIN ASSESSMENT: See HPI.  
GENERAL: Denies fever, chills malaise and   
weight loss.  
HEENT: No recent change in vision or   
hearing.  
CARDIOVASCULAR: Denies chest pain,   
history of A-fib, valvular disease, or  
pacemaker/ICD.  
RESPIRATORY: Hx of right upper lobectomy,   
treated as asthma  
GI: Denies GI ulcers, inflammatory   
disease, or liver disease.  
: Denies change in frequency or   
urgency, kidney disease, and burning  
with urination.  
MUSCULOSKELETAL: Positive for back pain  
SKIN: Denies rash or itching.  
PSYCHOLOGICAL: Not reviewed  
NEURO: Denies CVA, seizures, headaches.  
ENDOCRINE: Denies diabetes, thyroid   
disease.  
HEMATOLOGY/LYMPHOLOGY: Denies cancer,   
bleeding or clotting disorders,  
anemia,and DVT's.  
ALLERGIC/IMMUNOLOGICAL: Denies risks for   
infection, or recent MRSA  
infections.  
Patient Entered Questionnaires  
Spine Questions 10/6/2020 2021  
Pain Location: Neck Neck  
Pain Duration: More than 5 years More   
than 5 years  
Symptoms from neck/cervical spine: Yes   
Yes  
Employment Status: Other Working now  
Off work 1 month or more due to back/neck   
pain: Does not apply -  
Applied for/receive disability/WC due to   
low back/neck pain Does not apply  
-  
Involved in law suit/legal claim: No No  
Neck Questionnaires 10/6/2020 2021  
Benzel Modified IVAN Score Incomplete   
Incomplete  
PROMIS Score Percentiles  
Physical Health 10/6/2020 2021  
Physical Function Percentile 7** 12**  
Sleep Percentile 27* 34  
Fatigue Percentile 46 54  
Pain Interference Percentile 4** 4**  
Social Health 10/6/2020 2021  
Social Role Satisfaction Percentile 31 31  
PROMIS Global Health Scale 2015   
10/6/2020 2021  
Physical Health Percentile 10** 15** 15**  
Mental Health Percentile 9** 34 26*  
Percentiles provide an indication of how   
the patient's score ranks in  
relation to the general population.   
Higher percentile rankings indicate  
better function/quality of life. 50th   
percentile is the average of the  
general population and indicates half of   
respondents had a worse score.  
Depression Screening:  
PHQ-9 2015 10/6/2020 2021  
Score 4 3 0  
PHQ-9 Self-h (more content not   
included)...                            [facility]  
  
  
  
Progress note 2021  
  
  
                                Note Date & Type Note            Facility  
   
                                        2021 Note     HNO ID: [id number]  
Author: Valeria Montana (Pa)  
Service: ?  
Author Type: Physician Assistant  
Type: Progress Notes  
Filed: 3/5/2021 12:14 PM  
Note Text:  
This note was created using WikiBrains[employer].  
Subjective  
Telma Henry is a 30 year old male   
seen today via zoom meeting. He  
has ah/o developmental disabilities due   
to being born with active chicken  
pox. Patient had a chiari decompression   
in 2008 due to increased  
difficulty walking. Surgery was done by   
Dr. Castro. After surgery he  
started having pain in the neck and right   
shoulder area. He has been  
trying to have this treated, he has been   
through numerous pain regimes and  
trying to cure this pain himself.  
He continues to have more pain in the   
neck into the shoulder, down the R  
arm and into the middle finger and ring   
finger. He has no feeling in the  
arm for about 4 years. This started in   
the two middle fingers. Mother was  
told that this was caused by the syrinx.   
He can use the arm. He has  
started trembling and numbing on the   
forearm and upper arm over the past 6  
mos. The tremors going on for about 2   
years. He is now developing L wrist  
pain and tingling and wearing a wrist   
brace. Mother took him to the  
neurologist. He was concerned that his   
spine may have moved a bit. They  
have MRIs every year through their   
neurologist. No change in the walking.  
He has abnormal gait to begin with but no   
changes. He has lost some  
weight. No change in bowel or bladder   
function. He does have an on-going  
fissure.  
He has had a cortisone shot in the bursa   
of the R shoulder yesterday and  
they are waiting to see if this helps.  
Review of Systems  
Objective  
There were no vitals taken for this   
visit.  
Physical Exam  
Assessment and Plan  
S/p Chiari decompression   
Persistent cervicothoracic syrinx  
Ongoing pain in R neck, shoulder, arm  
New tremors in R hand  
Numbing in forearm and upper arm on R  
Pain in 4th and 5th digits  
Newer onset of L wrist pain  
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   
with active chicken pox.  
MRI reviewed from 2020  
Cervical and thoracic imaging  
Long thin cervical thoracic syrinx  
Large craniotomy defect  
Well reconstucted cisterna magna  
Recommend annual follow up on   
syringomyelia.  
Worsening scoliosis per report of mother  
Would recommend a spinal deformity   
specialist to see if there is any  
surgical recommendations to address the   
patient's pain.  
I spent a total of 30 minutes on the date   
of the service which included  
preparing to see the patient,   
face-to-face patient care, completing  
clinical documentation, obtaining and/or   
reviewing separately obtained  
history, performing a medically   
appropriate examination, counseling and  
educating the patient/family/caregiver,   
ordering medications, tests, or  
procedures, communicating with other HCPs   
(not separately reported),  
independently interpreting results (not   
separately reported),  
communicating results to the   
patient/family/caregiver and care  
coordination (not separately reported). [facility]  
  
  
  
Evaluation note  
  
  
                                Note Date & Type Note            Facility  
  
  
  
                                          
   
                                          
   
                                          
  
documented in this encounter  
[facility]  
  
Evaluation note  
  
  
                                Note Date & Type Note            Facility  
  
  
  
                                          
   
                                          
   
                                          
  
documented in this encounter  
[facility]  
  
Summary Purpose  
  
  
                                                      
  
  
  
Family History  
No Family History Records FoundNo Family History Records FoundNo Family History 
Records FoundNo Family History Records FoundNo Family History Records FoundNo 
Family History Records FoundNo Family History Records FoundNo Family History 
Records FoundNo Family History Records Found  
  
Advance Directives  
                                Documents on File  
  
                          Type         Date Recorded Patient Representative Expl  
anation  
   
                          ACP-Advance Directive                             
   
                          ACP-Power of                              
  
  
  
Assessments  
  
  
                                                    Diagnosis  
   
                                                      
  
  
Syringomyelia and syringobulbia (HCC)  
  
  
Syringomyelia and syringobulbia  
  
  
  
Additional Source Comments  
  
  
  
                                                    PREGNANCY (unrecognized sect  
ion and content)  
   
                                                    No Pregnancy Status Records   
FoundNo Pregnancy Status Records FoundNo Pregnancy   
Status   
Records FoundNo Pregnancy Status Records FoundNo Pregnancy Status Records 
FoundNo   
Pregnancy Status Records FoundNo Pregnancy Status Records FoundNo Pregnancy 
Status   
Records FoundNo Pregnancy Status Records Found  
  
  
  
  
                                                    INFORMATION SOURCE (unrecogn  
ized section and content)  
   
                                          
  
  
  
                                          
   
                                          
  
  
  
                                DATE CREATED    AUTHOR          AUTHOR'S ORGANIZ  
ATION  
   
                                2018                      [facility]  
  
  
  
                                DATE CREATED    AUTHOR          AUTHOR'S ORGANIZ  
ATION  
   
                                08/10/2019                      [facility] [place]  
  
  
  
                                DATE CREATED    AUTHOR          AUTHOR'S ORGANIZ  
ATION  
   
                                10/07/2020                      [facility]  
  
  
  
                                DATE CREATED    AUTHOR          AUTHOR'S ORGANIZ  
ATION  
   
                                2020                      [facility]  
tem  
  
  
  
                                DATE CREATED    AUTHOR          AUTHOR'S ORGANIZ  
ATION  
   
                                2021                      [facility]  
  
  
  
                                DATE CREATED    AUTHOR          AUTHOR'S ORGANIZ  
ATION  
   
                                2022                      [facility]  
oundation ([place])  
  
  
  
                                DATE CREATED    AUTHOR          AUTHOR'S ORGANIZ  
ATION  
   
                                2022                      [facility]  
tem [facility]  
  
  
  
                                DATE CREATED    AUTHOR          AUTHOR'S ORGANIZ  
ATION  
   
                                2023                      [facility]  
  
  
  
  
  
                                                    Source Comments (unrecognize  
d section and content)  
   
                                                    In the event this informatio  
n is protected by the Federal Confidentiality of   
Alcohol   
and Drug Abuse Patient Records regulations: This information has been disclosed 
to   
you from records protected by Federal confidentiality rules (42 CFR Part 2). The
   
Federal rules prohibit you from making any further disclosure of this 
information   
unless further disclosure is expressly permitted by the written consent of the 
person   
to whom it pertains or as otherwise permitted by 42 CFR Part 2. A general   
authorization for the release of medical or other information is NOT sufficient 
for   
this purpose. The Federal rules restrict any use of the information to 
criminally   
investigate or prosecute any alcohol or drug abuse patient.[facility] 
the   
event this information is protected by the Federal Confidentiality of Alcohol 
and   
Drug Abuse Patient Records regulations: This information has been disclosed to 
you   
from records protected by Federal confidentiality rules (42 CFR Part 2). The 
Federal   
rules prohibit you from making any further disclosure of this information unless
   
further disclosure is expressly permitted by the written consent of the person 
to   
whom it pertains or as otherwise permitted by 42 CFR Part 2. A general 
authorization   
for the release of medical or other information is NOT sufficient for this 
purpose.   
The Federal rules restrict any use of the information to criminally investigate 
or   
prosecute any alcohol or drug abuse patient.[facility]  
  
  
  
  
                                                    Reason for Visit (unrecogniz  
ed section and content)  
   
                                          
  
  
  
                                          
   
                                          
  
  
  
                                        Reason              Comments  
   
                                        Right middle finger swollen, red, puss [  
3 days] Right hand middle finger   
festering.   
Left hand index and little finger.  
  
  
  
  
  
                                                    Care Teams (unrecognized sec  
tion and content)  
   
                                          
  
  
  
                                          
   
                                          
  
  
  
                      Team Member Relationship Specialty  Start Date End Date  
   
                                                      
  
  
Alexa Barrera MD  
  
  
NPI: [id number]  
  
  
[address] [place] [id number] [phone] (Work)  
  
  
[phone] (Fax) PCP - General                   2/10/10           
  
  
FOR RECORDS PERTAINING TO PATIENTS WHO ARE OR HAVE BEEN ENROLLED IN A CHEMICAL 
DEPENDENCY/SUBSTANCEABUSE PROGRAM, SOME INFORMATION MAY BE OMITTED. This 
clinical summary was aggregated from multiple sources. Caution should be 
exercised in using it in the provision of clinical care. This summary normalizes
information from multiple sources, and as a consequence, information in this 
document may materially change the coding, format and clinical context of 
patient data. In addition, data may be omitted in some cases. CLINICAL DECISIONS
SHOULD BE BASED ON THE PRIMARY CLINICAL RECORDS. [facility]. provides 
no warranty or guarantee of the accuracy or completeness of information in this 
document.

## 2024-03-27 LAB
ANION GAP: 6 (ref 5–15)
BUN SERPL-MCNC: 24 MG/DL (ref 7–18)
BUN/CREAT RATIO: 27.1 RATIO (ref 10–20)
CALCIUM SERPL-MCNC: 9.9 MG/DL (ref 8.5–10.1)
CARBON DIOXIDE: 26 MMOL/L (ref 21–32)
CHLORIDE: 106 MMOL/L (ref 98–107)
DEPRECATED RDW RBC: 39.4 FL (ref 35.1–43.9)
ERYTHROCYTE [DISTWIDTH] IN BLOOD: 11.7 % (ref 11.6–14.6)
EST GLOM FILT RATE - AFR AMER: 128 ML/MIN (ref 60–?)
GLUCOSE: 85 MG/DL (ref 74–106)
HCT VFR BLD AUTO: 43.7 % (ref 40–54)
HEMOGLOBIN: 15 G/DL (ref 13–16.5)
HGB BLD-MCNC: 15 G/DL (ref 13–16.5)
MCV RBC: 92.2 FL (ref 80–94)
MEAN CORP HGB CONC: 34.3 G/DL (ref 32–36)
MEAN PLATELET VOL.: 10.5 FL (ref 6.2–12)
PLATELET # BLD: 286 K/MM3 (ref 150–450)
PLATELET COUNT: 286 K/MM3 (ref 150–450)
POTASSIUM: 3.9 MMOL/L (ref 3.5–5.1)
RBC # BLD AUTO: 4.74 M/MM3 (ref 4.6–6.2)
RBC DISTRIBUTION WIDTH CV: 11.7 % (ref 11.6–14.6)
RBC DISTRIBUTION WIDTH SD: 39.4 FL (ref 35.1–43.9)
WBC # BLD AUTO: 6.1 K/MM3 (ref 4.4–11)
WHITE BLOOD COUNT: 6.1 K/MM3 (ref 4.4–11)

## 2024-04-01 ENCOUNTER — HOSPITAL ENCOUNTER (OUTPATIENT)
Dept: HOSPITAL 100 - SDC | Age: 34
Discharge: HOME | End: 2024-04-01
Payer: MEDICARE

## 2024-04-01 DIAGNOSIS — Z01.818: Primary | ICD-10-CM

## 2024-04-01 PROCEDURE — 85027 COMPLETE CBC AUTOMATED: CPT

## 2024-04-01 PROCEDURE — 80048 BASIC METABOLIC PNL TOTAL CA: CPT

## 2024-04-01 PROCEDURE — 36415 COLL VENOUS BLD VENIPUNCTURE: CPT

## 2024-09-23 ENCOUNTER — HOSPITAL ENCOUNTER (OUTPATIENT)
Age: 34
Discharge: HOME | End: 2024-09-23
Payer: MEDICARE

## 2024-09-23 DIAGNOSIS — G95.0: Primary | ICD-10-CM

## 2024-09-23 DIAGNOSIS — G93.5: ICD-10-CM

## 2024-09-23 PROCEDURE — 72141 MRI NECK SPINE W/O DYE: CPT

## 2024-09-23 PROCEDURE — 72146 MRI CHEST SPINE W/O DYE: CPT

## 2024-09-23 NOTE — MRI_ITS
REPORT-ID:[id number] 
 
STUDY:  MRI THORACIC SPINE WITHOUT CONTRAST 
 
REASON FOR EXAM:   Male, 34 years old.  follow-up thoracic syrinx  Patient 
could not hold still, PT HAD LUNG REMOVED, HARD FOR PT TO CONTROL COUGH, 
BEST IMAGES POSSIBLE 
 
TECHNIQUE:   Standardized fat and water weighted pulse sequences were 
obtained in the sagittal and axial planes. 
 
COMPARISON:   MRI of the thoracic spine dated April 28, 2023 
___________________________________ 
 
FINDINGS: 
Normal kyphosis of the thoracic spine.  Redemonstration of moderate 
dextroscoliosis of the thoracic spine. No fracture or marrow edema or 
compression deformity is present. 
 
Redemonstration of moderate syrinx formation from the origin of the 
thoracic cord to T7. Normal remaining aspect of the spinal cord down to the 
conus. 
 
Moderate left foraminal stenosis at T8-T9 and T9-T10. 
 
T1-2, T2-3, T3-4, T4-5, T5-6, T6-7, T7-8, T8-9, T9-10, T10-11, T11-12: 
Mild asymmetric disc space narrowing and disc desiccation without 
significant posterior disc herniation or bulging or disc extrusions. 
Unremarkable endplates. Normal central canal and intervertebral neural 
foramina at the remaining corresponding levels. 
 
Normal conus medullaris that terminates at the T12-L1 level. 
 
The soft tissue structures are unremarkable. 
___________________________________ 
 
ORDER #: [id number] MRI/Spine  Thoracic (Routine)  
IMPRESSION:  
1.  Redemonstration of moderate syrinx formation from the origin of the  
thoracic cord to T7. Normal remaining aspect of the spinal cord down to the  
conus.  
 
  
Electronically Signed:  
Manolo Vásquez MD  
2024/09/24 at 11:30 EDT  
Reading Location ID and State: 9[address] / [place]  
Tel [phone], Service support  [phone], Fax [phone]

## 2024-09-23 NOTE — MRI_ITS
REPORT-ID:[id number] 
 
STUDY:   MRI CERVICAL SPINE WITHOUT CONTRAST 
 
REASON FOR EXAM:   Male, 34 years old.   follow-up cervical syrinx; Hx 
surgery for Chiari I 
 
TECHNIQUE:   Standardized fat and water weighted pulse sequences were 
obtained in the sagittal and axial planes. 
 
Technologist Notes Patient could not hold still, PT HAD LUNG REMOVED, HARD 
FOR PT TO CONTROL COUGH, BEST IMAGES POSSIBLE 
 
COMPARISON:   MRI of the cervical spine dated May 1, 2023 
___________________________________ 
 
FINDINGS: 
 
Prior suboccipital craniectomy. Midline surgical scar noted in this region. 
 
Redemonstration of mild syrinx formation and chronic myelomalacia with 
slightly increased signal within the cord substance from C2 down to the 
included portions of the thoracic cord. The MRI of the thoracic spine shows 
termination of the syrinx at T7. No cystic change or cord expansion 
demonstrated. 
 
Normal foramen magnum and brainstem-cervical cord junction.   Normal 
craniovertebral junction. Normal anterior atlantoaxial articulation. 
Normal odontoid process. 
 
There is straightening of the normal cervical lordosis.  No marrow edema or 
fracture or compression deformity is present. 
 
C2-3:  Normal endplates.  Normal disc height, signal and morphology. 
Normal central canal and intervertebral neural foramina. 
 
C3-4:  Normal endplates. Minimal posterior disc space narrowing and slight 
annular bulging.  Normal central canal and intervertebral neural foramina. 
 
C4-5:  Normal endplates.  Normal disc height, signal and morphology. 
Normal central canal and intervertebral neural foramina. 
 
C5-6:  Normal endplates. Mild disc desiccation and disc space narrowing 
without bulging or herniation.  Normal central canal and intervertebral 
neural foramina. 
 
C6-7:  Normal endplates.  Normal disc height, signal and morphology. 
Normal central canal and intervertebral neural foramina. 
 
C7-T1:  Normal endplates.  Normal disc height, signal and morphology. 
Normal central canal and intervertebral neural foramina. 
 
Normal visualized soft tissue structures. 
___________________________________ 
 
ORDER #: [id number] MRI/Spine  Cervical (Routine)  
IMPRESSION:  
1.  Minimal degenerative changes, as described above.  
2.  Redemonstration of mild syrinx formation and chronic myelomalacia with  
slightly increased signal within the cord substance from C2 down to the  
included portions of the thoracic cord. The MRI of the thoracic spine shows  
termination of the syrinx at T7. No cystic change or cord expansion  
demonstrated.  
 
  
Electronically Signed:  
Manolo Vásquez MD  
2024/09/24 at 11:45 EDT  
Reading Location ID and State: [address] / [place]  
Tel [phone], Service support  [phone], Fax [phone]